# Patient Record
Sex: FEMALE | Race: WHITE
[De-identification: names, ages, dates, MRNs, and addresses within clinical notes are randomized per-mention and may not be internally consistent; named-entity substitution may affect disease eponyms.]

---

## 2018-06-12 ENCOUNTER — HOSPITAL ENCOUNTER (EMERGENCY)
Dept: HOSPITAL 62 - ER | Age: 28
LOS: 1 days | Discharge: HOME | End: 2018-06-13
Payer: SELF-PAY

## 2018-06-12 DIAGNOSIS — Z88.0: ICD-10-CM

## 2018-06-12 DIAGNOSIS — Z65.9: ICD-10-CM

## 2018-06-12 DIAGNOSIS — F32.9: Primary | ICD-10-CM

## 2018-06-12 DIAGNOSIS — R45.851: ICD-10-CM

## 2018-06-12 DIAGNOSIS — F41.9: ICD-10-CM

## 2018-06-12 LAB
ADD MANUAL DIFF: NO
ALBUMIN SERPL-MCNC: 4.1 G/DL (ref 3.5–5)
ALP SERPL-CCNC: 83 U/L (ref 38–126)
ALT SERPL-CCNC: 20 U/L (ref 9–52)
ANION GAP SERPL CALC-SCNC: 12 MMOL/L (ref 5–19)
APAP SERPL-MCNC: < 10 UG/ML (ref 10–30)
APPEARANCE UR: (no result)
APTT PPP: YELLOW S
AST SERPL-CCNC: 16 U/L (ref 14–36)
BARBITURATES UR QL SCN: NEGATIVE
BASOPHILS # BLD AUTO: 0.1 10^3/UL (ref 0–0.2)
BASOPHILS NFR BLD AUTO: 0.8 % (ref 0–2)
BILIRUB DIRECT SERPL-MCNC: 0.2 MG/DL (ref 0–0.4)
BILIRUB SERPL-MCNC: 0.3 MG/DL (ref 0.2–1.3)
BILIRUB UR QL STRIP: NEGATIVE
BUN SERPL-MCNC: 14 MG/DL (ref 7–20)
CALCIUM: 9.6 MG/DL (ref 8.4–10.2)
CHLORIDE SERPL-SCNC: 109 MMOL/L (ref 98–107)
CO2 SERPL-SCNC: 23 MMOL/L (ref 22–30)
EOSINOPHIL # BLD AUTO: 0.6 10^3/UL (ref 0–0.6)
EOSINOPHIL NFR BLD AUTO: 5.4 % (ref 0–6)
ERYTHROCYTE [DISTWIDTH] IN BLOOD BY AUTOMATED COUNT: 13.2 % (ref 11.5–14)
ETHANOL SERPL-MCNC: < 10 MG/DL
GLUCOSE SERPL-MCNC: 106 MG/DL (ref 75–110)
GLUCOSE UR STRIP-MCNC: NEGATIVE MG/DL
HCT VFR BLD CALC: 45.6 % (ref 36–47)
HGB BLD-MCNC: 15.6 G/DL (ref 12–15.5)
KETONES UR STRIP-MCNC: NEGATIVE MG/DL
LYMPHOCYTES # BLD AUTO: 3.4 10^3/UL (ref 0.5–4.7)
LYMPHOCYTES NFR BLD AUTO: 28.8 % (ref 13–45)
MCH RBC QN AUTO: 30.5 PG (ref 27–33.4)
MCHC RBC AUTO-ENTMCNC: 34.2 G/DL (ref 32–36)
MCV RBC AUTO: 89 FL (ref 80–97)
METHADONE UR QL SCN: NEGATIVE
MONOCYTES # BLD AUTO: 0.9 10^3/UL (ref 0.1–1.4)
MONOCYTES NFR BLD AUTO: 7.9 % (ref 3–13)
NEUTROPHILS # BLD AUTO: 6.8 10^3/UL (ref 1.7–8.2)
NEUTS SEG NFR BLD AUTO: 57.1 % (ref 42–78)
NITRITE UR QL STRIP: NEGATIVE
PCP UR QL SCN: NEGATIVE
PH UR STRIP: 5 [PH] (ref 5–9)
PLATELET # BLD: 341 10^3/UL (ref 150–450)
POTASSIUM SERPL-SCNC: 4.3 MMOL/L (ref 3.6–5)
PROT SERPL-MCNC: 7.5 G/DL (ref 6.3–8.2)
PROT UR STRIP-MCNC: NEGATIVE MG/DL
RBC # BLD AUTO: 5.11 10^6/UL (ref 3.72–5.28)
SALICYLATES SERPL-MCNC: < 1 MG/DL (ref 2–20)
SODIUM SERPL-SCNC: 144.2 MMOL/L (ref 137–145)
SP GR UR STRIP: 1.02
TOTAL CELLS COUNTED % (AUTO): 100 %
URINE AMPHETAMINES SCREEN: NEGATIVE
URINE BENZODIAZEPINES SCREEN: NEGATIVE
URINE COCAINE SCREEN: NEGATIVE
URINE MARIJUANA (THC) SCREEN: NEGATIVE
UROBILINOGEN UR-MCNC: NEGATIVE MG/DL (ref ?–2)
WBC # BLD AUTO: 11.9 10^3/UL (ref 4–10.5)

## 2018-06-12 PROCEDURE — 93005 ELECTROCARDIOGRAM TRACING: CPT

## 2018-06-12 PROCEDURE — 80053 COMPREHEN METABOLIC PANEL: CPT

## 2018-06-12 PROCEDURE — 84703 CHORIONIC GONADOTROPIN ASSAY: CPT

## 2018-06-12 PROCEDURE — 81001 URINALYSIS AUTO W/SCOPE: CPT

## 2018-06-12 PROCEDURE — 36415 COLL VENOUS BLD VENIPUNCTURE: CPT

## 2018-06-12 PROCEDURE — 80307 DRUG TEST PRSMV CHEM ANLYZR: CPT

## 2018-06-12 PROCEDURE — 99285 EMERGENCY DEPT VISIT HI MDM: CPT

## 2018-06-12 PROCEDURE — 93010 ELECTROCARDIOGRAM REPORT: CPT

## 2018-06-12 PROCEDURE — 85025 COMPLETE CBC W/AUTO DIFF WBC: CPT

## 2018-06-12 NOTE — ER DOCUMENT REPORT
ED General





- General


Mode of Arrival: Ambulatory


Information source: Patient


TRAVEL OUTSIDE OF THE [place] IN LAST 30 DAYS: No





<RHIANNA KHAN - Last Filed: 06/13/18 00:11>





<RADHA CRUZ - Last Filed: 06/13/18 02:48>





- General


Chief Complaint: Psych Problem


Stated Complaint: SUICIDAL IDEATION


Time Seen by Provider: 06/12/18 21:50


Notes: 





27 y.o female with a PMHx of asthma and depression presents to the ED with SI. 

She reports that for the past week she has been having SI which she has had in 

the past as well but has never been institutionalized or seen a counselor. Pt 

reports that she used to take Prozac but stopped taking it about a year ago 

because she does not have health insurance anymore. She states that she locked 

herself in her room all day today to avoid hurting herself. She reports a plan 

of cutting her arms. Pt denies any other medical issues.  (RHIANNA KHAN)





- Related Data


Allergies/Adverse Reactions: 


 





mustard [Mustard] Allergy (Verified 08/25/17 08:15)


 


Penicillins Allergy (Verified 08/25/17 08:15)


 











Past Medical History





- General


Information source: Patient, Parent





- Social History


Smoking Status: Never Smoker


Cigarette use (# per day): No


Chew tobacco use (# tins/day): No


Smoking Education Provided: No


Frequency of alcohol use: Occasional


Drug Abuse: None


Family History: Reviewed & Not Pertinent


Pulmonary Medical History: Reports: Hx Asthma


Renal/ Medical History: Denies: Hx Peritoneal Dialysis


Psychiatric Medical History: Reports: Hx Depression





- Immunizations


Hx Diphtheria, Pertussis, Tetanus Vaccination: Yes





<RHIANNA KHAN - Last Filed: 06/13/18 00:11>





Review of Systems





- Review of Systems


Constitutional: No symptoms reported


EENT: No symptoms reported


Cardiovascular: No symptoms reported


Respiratory: No symptoms reported


Gastrointestinal: No symptoms reported


Genitourinary: No symptoms reported


Female Genitourinary: No symptoms reported


Musculoskeletal: No symptoms reported


Skin: No symptoms reported


Hematologic/Lymphatic: No symptoms reported


Neurological/Psychological: See HPI, Suicidal ideation


-: Yes All other systems reviewed and negative





<RHIANNA KHAN - Last Filed: 06/13/18 00:11>





Physical Exam





<RHIANNA KHAN - Last Filed: 06/13/18 00:11>





<RADHA CRUZ - Last Filed: 06/13/18 02:48>





- Vital signs


Vitals: 





 











Temp Pulse Resp BP Pulse Ox


 


 99.0 F   93   20   123/70   96 


 


 06/12/18 22:07  06/12/18 22:07  06/12/18 22:07  06/12/18 22:07  06/12/18 22:07














- Notes


Notes: 





Physical Exam:


 





General: Alert.


 


HEENT: Normocephalic. Atraumatic. PERRL. Extraocular movements intact. 

Oropharynx clear.


 


Neck: Supple. Non-tender.


 


Respiratory: No respiratory distress. Clear and equal breath sounds bilaterally.


 


Cardiovascular: Regular rate and rhythm. 


 


Abdominal: Normal Inspection. Non-tender. No distension. Normal Bowel Sounds. 


 


Back: Non-tender. No deformity or step off.


 


Extremities: Moves all four extremities.


Upper extremities: Normal inspection. Normal ROM.  


Lower extremities: Normal inspection. No edema. Normal ROM.


 


Neurological: Normal cognition. AAOx3. Normal speech.  


 


Psychological: Flat affect.


 


Skin: Warm. Dry. Normal color. (RHIANNA KHAN)





Course





- Laboratory


Result Diagrams: 


 06/12/18 22:35





 06/12/18 22:35





<RHIANNA KHAN - Last Filed: 06/13/18 00:11>





- Laboratory


Result Diagrams: 


 06/12/18 22:35





 06/12/18 22:35





<RADHA CRUZ - Last Filed: 06/13/18 02:48>





- Re-evaluation


Re-evalutation: 





06/13/18 


Patient is a 27-year-old female with a history of depression, who comes in 

complaining of suicidal ideation with plan to take a knife and cut her arm.  

Patient has a history of depression and was taking Prozac up until a year ago 

when she lost her insurance.  She has been seen at  and see before.  She 

would have like to return up with the needed money upfront from her which the 

patient does not have.  Patient is medically stable with no acute findings on 

blood work.  She has been placed on involuntary commitment paper work due to 

her suicidal ideation with plan.  She will be evaluated by mental health in the 

morning.  Social work consult has also been placed.  Stable at this time.


 (RADHA CRUZ)





- Vital Signs


Vital signs: 





 











Temp Pulse Resp BP Pulse Ox


 


 99.0 F   93   20   123/70   96 


 


 06/12/18 22:07  06/12/18 22:07  06/12/18 22:07  06/12/18 22:07  06/12/18 22:07














- Laboratory


Laboratory results interpreted by me: 





 











  06/12/18 06/12/18 06/12/18





  22:35 22:35 22:35


 


WBC  11.9 H  


 


Hgb  15.6 H  


 


Chloride   109 H 


 


Urine Blood    SMALL H


 


Ur Leukocyte Esterase    SMALL H


 


Salicylates   < 1.0 L 


 


Acetaminophen   < 10 L 














Discharge





<RHIANNA KHAN - Last Filed: 06/13/18 00:11>





<RADHA CRUZ - Last Filed: 06/13/18 02:48>





- Discharge


Clinical Impression: 


 Suicidal ideation





Depression


Qualifiers:


 Depression Type: unspecified Qualified Code(s): F32.9 - Major depressive 

disorder, single episode, unspecified





Condition: Stable


Disposition: OTHER


Scribe Attestation: 





06/13/18 02:48


I personally performed the services described in the documentation, reviewed 

and edited the documentation which was dictated to the scribe in my presence, 

and it accurately records my words and actions. (RADHA CRUZ)





Scribe Documentation





- Scribe


Written by Scribe:: Yahaira Barrios 6/13/18 0006 


acting as scribe for :: Joanne





<RHIANNA KHAN - Last Filed: 06/13/18 00:11>

## 2018-06-13 VITALS — DIASTOLIC BLOOD PRESSURE: 70 MMHG | SYSTOLIC BLOOD PRESSURE: 142 MMHG
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
## 2019-01-25 ENCOUNTER — HOSPITAL ENCOUNTER (EMERGENCY)
Dept: HOSPITAL 62 - ER | Age: 29
Discharge: HOME | End: 2019-01-25
Payer: SELF-PAY

## 2019-01-25 VITALS — DIASTOLIC BLOOD PRESSURE: 77 MMHG | SYSTOLIC BLOOD PRESSURE: 120 MMHG

## 2019-01-25 DIAGNOSIS — R50.9: ICD-10-CM

## 2019-01-25 DIAGNOSIS — R07.81: ICD-10-CM

## 2019-01-25 DIAGNOSIS — Z88.0: ICD-10-CM

## 2019-01-25 DIAGNOSIS — R07.1: Primary | ICD-10-CM

## 2019-01-25 DIAGNOSIS — R05: ICD-10-CM

## 2019-01-25 PROCEDURE — 71046 X-RAY EXAM CHEST 2 VIEWS: CPT

## 2019-01-25 PROCEDURE — 99283 EMERGENCY DEPT VISIT LOW MDM: CPT

## 2019-01-25 PROCEDURE — 93010 ELECTROCARDIOGRAM REPORT: CPT

## 2019-01-25 PROCEDURE — 93005 ELECTROCARDIOGRAM TRACING: CPT

## 2019-01-25 NOTE — RADIOLOGY REPORT (SQ)
EXAM DESCRIPTION:  CHEST 2 VIEWS



COMPLETED DATE/TIME:  1/25/2019 6:30 pm



REASON FOR STUDY:  pain



COMPARISON:  2/4/2015



EXAM PARAMETERS:  NUMBER OF VIEWS: two views

TECHNIQUE: Digital Frontal and Lateral radiographic views of the chest acquired.

RADIATION DOSE: NA

LIMITATIONS: none



FINDINGS:  LUNGS AND PLEURA: No opacities, masses or pneumothorax. No pleural effusion.

MEDIASTINUM AND HILAR STRUCTURES: No masses or contour abnormalities.

HEART AND VASCULAR STRUCTURES: Heart normal size.  No evidence for failure.

BONES: No acute findings.

HARDWARE: None in the chest.

OTHER: No other significant finding.



IMPRESSION:  NO ACUTE RADIOGRAPHIC FINDING IN THE CHEST.



TECHNICAL DOCUMENTATION:  JOB ID:  [id number]

 2011 [employer]- All Rights Reserved



Reading location - IP/workstation name: [email or web address]

## 2019-01-25 NOTE — ER DOCUMENT REPORT
HPI





- HPI


Patient complains to provider of: chest pains


Time Seen by Provider: 01/25/19 18:03


Pain Level: 3


Context: 





The emergency she had a upper respiratory infection, cough, congestion, 

subjective fever.  Since then she has felt an intermittent pinching feeling in 

the center of her chest.  States she also then has an episode of posttussive 

vomiting.  Patient states she continues with a cough and congestion but no 

longer has any fevers from her URI illness last week.  Patient states chest pain

does increase when she takes a deep breath and when she touches the center of 

her chest.  Patient states she does not have insurance but does go to a mental 

health facility for routine counseling.  Patient denies any cardiac history for 

herself, her immediate family.





past medical history: Anxiety, asthma


Medications: None


Allergies: Penicillin





- REPRODUCTIVE


Reproductive: DENIES: Pregnant:





Past Medical History





- General


Information source: Patient





- Social History


Smoking Status: Unknown if Ever Smoked


Family History: Reviewed & Not Pertinent


Patient has suicidal ideation: No


Patient has homicidal ideation: No


Pulmonary Medical History: Reports: Hx Asthma


Renal/ Medical History: Denies: Hx Peritoneal Dialysis


Psychiatric Medical History: Reports: Hx Depression





- Immunizations


Hx Diphtheria, Pertussis, Tetanus Vaccination: Yes





Vertical Provider Document





- CONSTITUTIONAL


Agree With Documented VS: Yes


Notes: 


GENERAL: Alert, interacts well. No acute distress.


HEAD: Normocephalic, atraumatic.


EYES: Pupils equal, round, and reactive to light. Extraocular movements intact.


ENT: Oral mucosa moist, tongue midline. 


NECK: Full range of motion. Supple. Trachea midline.


LUNGS: Clear to auscultation bilaterally, no wheezes, rales, or rhonchi. No 

respiratory distress.


HEART: Regular rate and rhythm. No murmur


CHEST: Anterior posterior chest wall, no erythema or ecchymosis noted.


ABDOMEN: Soft, non-tender. Non-distended. Bowel sounds present in all 4 

quadrants.


EXTREMITIES: Moves all 4 extremities spontaneously. No edema, normal radial and 

dorsalis pedis pulses bilaterally. No cyanosis.


BACK: no cervical, thoracic, lumbar midline tenderness. No saddle anesthesia, 

normal distal neurovascular exam. 


NEUROLOGICAL: Alert and oriented x3. Normal speech. cranial nerves II through 

XII grossly intact 


PSYCH: Normal affect, normal mood.


SKIN: Warm, dry, normal turgor. No rashes or lesions noted.








- INFECTION CONTROL


TRAVEL OUTSIDE OF THE [place] IN LAST 30 DAYS: No





Course





- Re-evaluation


Re-evalutation: 





01/25/19 20:05


Patient's chest x-ray shows no signs of pneumothorax, pneumonia, cardiomegaly, 

rib fractures.  Patient's EKG shows a sinus rhythm at a rate of 78, , no 

ST segment elevations or depressions noted.





Because patient's chest pain is following a URI type illness, increases upon 

palpation, deep inspiration, cough I am suspecting this is muscular skeletal in 

nature.  Discussed close follow-up at [facility].  Patient voices 

understanding and states that Motrin has helped her pain.  Patient stable for 

discharge.





- Vital Signs


Vital signs: 


                                        











Temp Pulse Resp BP Pulse Ox


 


 98.3 F   71   18   117/68   98 


 


 01/25/19 15:34  01/25/19 15:34  01/25/19 15:34  01/25/19 15:34  01/25/19 15:34














Discharge





- Discharge


Clinical Impression: 


Chest pain


Qualifiers:


 Chest pain type: chest pain on breathing Qualified Code(s): R07.1 - Chest pain 

on breathing; R07.81 - Pleurodynia





Condition: Stable


Disposition: HOME, SELF-CARE


Instructions:  Chest Wall Pain ([facility])


Additional Instructions: 


As we discussed your x-ray and EKG were normal.  At this time I do believe your 

chest pain is muscular in nature.  Please continue to take over-the-counter 

Tylenol and Motrin for your generalized pain.  Please follow up at [facility], phone numbers will be provided in this packet.  Please return to the 

emergency room should you have any other signs and symptoms


Referrals: 


[facility] [Provider Group] - Follow up as needed

## 2019-02-21 ENCOUNTER — HOSPITAL ENCOUNTER (EMERGENCY)
Dept: HOSPITAL 62 - ER | Age: 29
Discharge: HOME | End: 2019-02-21
Payer: SELF-PAY

## 2019-02-21 VITALS — SYSTOLIC BLOOD PRESSURE: 153 MMHG | DIASTOLIC BLOOD PRESSURE: 81 MMHG

## 2019-02-21 DIAGNOSIS — R05: Primary | ICD-10-CM

## 2019-02-21 DIAGNOSIS — J45.909: ICD-10-CM

## 2019-02-21 DIAGNOSIS — R07.9: ICD-10-CM

## 2019-02-21 PROCEDURE — 93010 ELECTROCARDIOGRAM REPORT: CPT

## 2019-02-21 PROCEDURE — 93005 ELECTROCARDIOGRAM TRACING: CPT

## 2019-02-21 PROCEDURE — 71046 X-RAY EXAM CHEST 2 VIEWS: CPT

## 2019-02-21 PROCEDURE — 99283 EMERGENCY DEPT VISIT LOW MDM: CPT

## 2019-02-21 NOTE — RADIOLOGY REPORT (SQ)
EXAM DESCRIPTION:  CHEST 2 VIEWS



COMPLETED DATE/TIME:  2/21/2019 7:43 am



REASON FOR STUDY:  cough x2 weeks



COMPARISON:  Two-view chest 1/25/2019, 2/4/2015



EXAM PARAMETERS:  NUMBER OF VIEWS: two views

TECHNIQUE: Digital Frontal and Lateral radiographic views of the chest acquired.

RADIATION DOSE: NA

LIMITATIONS: none



FINDINGS:  LUNGS AND PLEURA: No opacities, masses or pneumothorax. No pleural effusion.

MEDIASTINUM AND HILAR STRUCTURES: No masses or contour abnormalities.

HEART AND VASCULAR STRUCTURES: Heart normal size.  No evidence for failure.

BONES: No acute findings.

HARDWARE: None in the chest.

OTHER: No other significant finding.



IMPRESSION:  NO ACUTE RADIOGRAPHIC FINDING IN THE CHEST.



TECHNICAL DOCUMENTATION:  JOB ID:  [id number]

 2011 [employer]- All Rights Reserved



Reading location - IP/workstation name: [email or web address]

## 2019-02-21 NOTE — ER DOCUMENT REPORT
ED General





- General


Chief Complaint: Chest Pain


Stated Complaint: CHEST PAIN


Time Seen by Provider: 02/21/19 07:04


Notes: 





Patient is a 28-year-old female who presents to the emergency department with a 

chief complaint of chest pain and a cough for the past 2 weeks.  She states that

she has a history of asthma.  She states that her chest pain is in the middle of

her chest and it happens every time she coughs.  She was seen here in the emerg

ency department at the end of January and at that time was sent home.  She also 

states that she does have a hard time taking in good deep breaths.  She denies 

any fever, nausea, diarrhea, abdominal pain, or flank pain.


TRAVEL OUTSIDE OF THE [place] IN LAST 30 DAYS: No





- Related Data


Allergies/Adverse Reactions: 


                                        





mustard [Mustard] Allergy (Verified 01/25/19 15:04)


   


Penicillins Allergy (Verified 01/25/19 15:04)


   











Past Medical History





- Social History


Smoking Status: Never Smoker


Chew tobacco use (# tins/day): No


Frequency of alcohol use: None


Drug Abuse: None


Family History: Reviewed & Not Pertinent


Patient has suicidal ideation: No


Patient has homicidal ideation: No


Pulmonary Medical History: Reports: Hx Asthma


Renal/ Medical History: Denies: Hx Peritoneal Dialysis


Psychiatric Medical History: Reports: Hx Depression - and anxiety





- Immunizations


Hx Diphtheria, Pertussis, Tetanus Vaccination: Yes





Review of Systems





- Review of Systems


Notes: 





REVIEW OF SYSTEMS:





CONSTITUTIONAL :    Denies recent illness.  Denies recent unintentional weight 

loss.  Denies fever,  chills, or sweats. 


EENT: Denies eye, ear, throat, or mouth pain, discharge, or symptoms.  Denies 

nasal or sinus congestion.


CARDIOVASCULAR: See HPI


RESPIRATORY: See HPI


GASTROINTESTINAL: Denies nausea, vomiting, and diarrhea.  Denies abdominal pain.

  Denies constipation. 


GENITOURINARY:  Denies difficulty urinating, burning, blood in urine, urgency or

 frequency.


MUSCULOSKELETAL:  Denies neck and back pain.  Denies joint pain or swelling.


SKIN:   Denies rash, itchiness, or lesions


HEMATOLOGIC :   Denies easy bruising or bleeding.


LYMPHATIC:  Denies swollen, painful, enlarged glands.


NEUROLOGICAL: Denies no numbness or tingling denies weakness.  Denies headache. 

 Denies altered mental status.  Denies alteration in speech.


PSYCHIATRIC:  Denies stress, anxiety, alteration in sleep patterns, or 

depression.





All other systems reviewed and negative.





Physical Exam





- Vital signs


Vitals: 


                                        











Temp Pulse Resp BP Pulse Ox


 


 98.1 F   90   22 H  147/92 H  97 


 


 02/21/19 05:49  02/21/19 05:49  02/21/19 05:49  02/21/19 05:49  02/21/19 05:49














- Notes


Notes: 





PHYSICAL EXAMINATION:





GENERAL: Appears well, healthy, well-nourished, no acute distress. 





HEAD:  Normocephalic, atraumatic.





EYES:  PERRL, conjunctiva normal, all extraocular movements intact, sclera 

nonicteric





ENT:  Moist mucous membranes. 





NECK: Supple, no noticeable swelling, redness, rash.  Normal range of motion.





LUNGS: Equal breath sounds bilaterally and clear to auscultation.  No wheezes 

rales or rhonchi.





CARDIOVASCULAR: S1-S2, regular rate, regular rhythm.  Radial pulses 2+, normal.





ABDOMEN: Normoactive bowel sounds.  Soft, nontender,  no guarding, no rebound 

tenderness, and no masses palpated.





EXTREMITIES: Normal strength and range of motion, no pitting or edema.  No 

cyanosis. 





NEUROLOGICAL: Moves all extremities upon command.  Strength 5/5 in all 

extremities. 





PSYCH: Normal mood, normal affect.





SKIN: Warm, dry.  No rash, lesions, ulcerations noted.  Normal skin turgor.





Course





- Re-evaluation


Re-evalutation: 


Differential diagnosis includes pneumonia, bronchitis, upper respiratory viral i

nfection.  I do not suspect the patient has an acute MI, acute pulmonary emboli,

 or any other life-threatening etiology based off patient's symptoms and the 

fact that she has had a cough for a long period of time.


02/21/19 07:18


The patient will be given Tessalon Perles for her symptoms and a chest x-ray 

will be done.


02/21/19 08:29


The patient's chest x-ray is negative for pneumonia.  She will be given an 

albuterol inhaler and Tessalon Perles to help with her symptoms.  She does not 

look septic in appearance.  She actually looks rather well for having a cough.  

Verbal discharge instructions were given to the patient.  They verbalized 

understanding.  They are stable for discharge.




















- Vital Signs


Vital signs: 


                                        











Temp Pulse Resp BP Pulse Ox


 


 97.7 F   70   16   153/81 H  97 


 


 02/21/19 09:08  02/21/19 09:08  02/21/19 09:08  02/21/19 09:08  02/21/19 09:08














- EKG Interpretation by Me


Additional EKG results interpreted by me: 





02/21/19 07:19


Sinus rhythm.  Heart rate 81.  ; QRS 88; ; .  No ST 

elevations or depressions.  No change from previous EKG.





Discharge





- Discharge


Clinical Impression: 


 Cough





Condition: Stable


Disposition: HOME, SELF-CARE


Additional Instructions: 


You were seen today in the emergency department for a cough.  Your symptoms are 

most consistent with bronchitis.  You have been given an albuterol inhaler, to 

help with your difficulty breathing.  You have also been given Tessalon Perles, 

medication to help with your cough.  Please take as directed.  If you have 

worsening symptoms, or unable to breathe, or have symptoms that are worrisome to

 you, please return to the emergency department.


Prescriptions: 


Benzonatate [Tessalon Perles 100 mg Capsule] 100 mg PO Q8HP PRN #40 capsule


 PRN Reason: 


Forms:  Return to Work

## 2019-03-28 ENCOUNTER — HOSPITAL ENCOUNTER (EMERGENCY)
Dept: HOSPITAL 62 - ER | Age: 29
Discharge: HOME | End: 2019-03-28
Payer: SELF-PAY

## 2019-03-28 VITALS — SYSTOLIC BLOOD PRESSURE: 148 MMHG | DIASTOLIC BLOOD PRESSURE: 97 MMHG

## 2019-03-28 DIAGNOSIS — R19.7: ICD-10-CM

## 2019-03-28 DIAGNOSIS — B97.89: ICD-10-CM

## 2019-03-28 DIAGNOSIS — R11.10: ICD-10-CM

## 2019-03-28 DIAGNOSIS — J06.9: Primary | ICD-10-CM

## 2019-03-28 DIAGNOSIS — R05: ICD-10-CM

## 2019-03-28 DIAGNOSIS — R09.81: ICD-10-CM

## 2019-03-28 DIAGNOSIS — J45.909: ICD-10-CM

## 2019-03-28 DIAGNOSIS — J02.9: ICD-10-CM

## 2019-03-28 PROCEDURE — 87880 STREP A ASSAY W/OPTIC: CPT

## 2019-03-28 PROCEDURE — 87070 CULTURE OTHR SPECIMN AEROBIC: CPT

## 2019-03-28 PROCEDURE — 99283 EMERGENCY DEPT VISIT LOW MDM: CPT

## 2019-03-28 PROCEDURE — S0119 ONDANSETRON 4 MG: HCPCS

## 2019-03-28 PROCEDURE — 96372 THER/PROPH/DIAG INJ SC/IM: CPT

## 2019-03-28 PROCEDURE — 87077 CULTURE AEROBIC IDENTIFY: CPT

## 2019-03-28 NOTE — ER DOCUMENT REPORT
HPI





- HPI


Time Seen by Provider: 03/28/19 19:23


Pain Level: 5


Notes: 





Patient is an otherwise healthy 28-year-old female who presents to the emergency

department with chief complaint of cough, congestion and diarrhea for 3 days.  

Patient reports she has vomited a few times as well.  Patient reports she took 

Zofran at home which helped her symptoms.  She denies any abdominal pain, denies

any back pain or dysuria.  Patient denies any abnormal vaginal discharge.








- EENT


EENT: REPORTS: Sore Throat





- RESPIRATORY


Respiratory: REPORTS: Coughing





- GASTROINTESTINAL


Gastrointestinal: DENIES: Black / Bloody Stools





- REPRODUCTIVE


Reproductive: DENIES: Pregnant:





Past Medical History





- General


Information source: Patient





- Social History


Smoking Status: Never Smoker


Family History: Reviewed & Not Pertinent


Patient has suicidal ideation: No


Patient has homicidal ideation: No


Pulmonary Medical History: Reports: Hx Asthma


Renal/ Medical History: Denies: Hx Peritoneal Dialysis


Psychiatric Medical History: Reports: Hx Depression - and anxiety





- Immunizations


Hx Diphtheria, Pertussis, Tetanus Vaccination: Yes





Vertical Provider Document





- CONSTITUTIONAL


Notes: 





PHYSICAL EXAMINATION:





GENERAL: Well-appearing, well-nourished and in no acute distress.





HEAD: Atraumatic, normocephalic.





EYES: Pupils equal round extraocular movements intact,  conjunctiva are normal.





ENT: Nares patent, throat mildly erythematous but without tonsillar swelling or 

exudates.  No evidence of peritonsillar abscess.





NECK: Normal range of motion, mild cervical lymphadenopathy.





LUNGS: No respiratory distress, lung sounds clear to auscultation bilaterally.





Musculoskeletal: Normal range of motion





NEUROLOGICAL:  Normal speech, normal gait. 





PSYCH: Normal mood, normal affect.





SKIN: Warm, Dry, normal turgor, no rashes or lesions noted.





- INFECTION CONTROL


TRAVEL OUTSIDE OF THE [place] IN LAST 30 DAYS: No





Course





- Re-evaluation


Re-evalutation: 





Rapid strep is negative.  Patient able to tolerate p.o. after administration of 

antiemetics.  Physical examination is unremarkable, abdomen is soft, nontender. 

Throat mildly erythematous but without tonsillar swelling or exudates, no 

evidence of peritonsillar abscess.  Likely viral upper respiratory illness.  

Patient stable for discharge home.











- Vital Signs


Vital signs: 


                                        











Temp Pulse Resp BP Pulse Ox


 


 97.9 F   103 H  18   137/76 H  96 


 


 03/28/19 18:04  03/28/19 18:04  03/28/19 18:04  03/28/19 18:04  03/28/19 18:04














Discharge





- Discharge


Clinical Impression: 


 Sore throat, Viral upper respiratory illness





Condition: Stable


Disposition: HOME, SELF-CARE


Additional Instructions: 


Upper Respiratory Illness


     You have a viral infection of the respiratory passages -- a "cold."  This 

common infection causes nasal congestion, drainage, and often sore throat and 

cough.  It is caused by a virus and is highly contagious.  The disease usually 

lasts a week or more, though the worst symptoms are usually over in 3 or 4 days.


     There is no "cure" for the viral infection -- it must run its course.  If 

there is a complication, such as bacterial infection in the nose, sinuses, 

middle ear, or bronchial tubes, antibiotics may be required, but antibiotics 

won't affect the virus.


     If you smoke, you should STOP!!  Drink plenty of fluids.  A humidifier may 

help.  An expectorant medication or decongestant may make you more comfortable. 

Use acetaminophen or ibuprofen for fever or aches.


     See the doctor if fever persists over two or three days, if there is any 

significant worsening of your symptoms, or if you simply fail to improve as 

expected.








SORE THROAT:


     Sore throats may be caused by viruses, bacteria, or fungi.  Most are due to

a virus, and must get better on their own.  Bacterial sore throats, particularly

those due to "strep," need treatment with antibiotics.


     If an antibiotic is prescribed, be sure to take the medication for a full 

10 days.  Failure to take the antibiotic can result in complications such as 

rheumatic fever.  Sometimes, an injection of antibiotics is given instead of 

pills or liquid.  This single "shot" is equal in effectiveness to the oral 

medication.


     To relieve symptoms, take acetaminophen for pain.  Sip clear liquids 

frequently, or eat popsicles or ice chips.  Anesthetic sprays or lozenges may 

help.  Make sure the air in the room is not too dry. Avoid using decongestants 

or antihistamines.


     Call the doctor if there is no improvement in two days, or if you have 

difficulty breathing, increasing throat pain, high fever, rash, or frequent 

vomiting.








FOLLOW-UP CARE:


If you have been referred to a physician for follow-up care, call the [facility] office for an appointment as you were instructed or within the next two 

days.  If you experience worsening or a significant change in your symptoms, 

notify the physician immediately or return to the Emergency Department at any 

time for re-evaluation.





****The rapid strep test today was negative.  Please take Zofran if needed for 

nausea.  Drink plenty of fluids.  Throat culture is pending, someone will call 

you in the next 48-72 hours if there is any abnormality.****








Prescriptions: 


Ondansetron [Zofran Odt 4 mg Tablet] 1 - 2 tab PO Q4H PRN #15 tab.rapdis


 PRN Reason: For Nausea/Vomiting


Promethazine HCl [Phenergan 25 mg Tablet] 1 - 2 tab PO Q6H PRN #15 tablet


 PRN Reason: 


Forms:  Return to Work

## 2019-11-07 ENCOUNTER — HOSPITAL ENCOUNTER (EMERGENCY)
Dept: HOSPITAL 62 - ER | Age: 29
Discharge: HOME | End: 2019-11-07
Payer: SELF-PAY

## 2019-11-07 VITALS — DIASTOLIC BLOOD PRESSURE: 92 MMHG | SYSTOLIC BLOOD PRESSURE: 150 MMHG

## 2019-11-07 DIAGNOSIS — K08.89: ICD-10-CM

## 2019-11-07 DIAGNOSIS — K08.409: ICD-10-CM

## 2019-11-07 DIAGNOSIS — Z87.891: ICD-10-CM

## 2019-11-07 DIAGNOSIS — J45.909: ICD-10-CM

## 2019-11-07 DIAGNOSIS — K02.9: Primary | ICD-10-CM

## 2019-11-07 PROCEDURE — 96374 THER/PROPH/DIAG INJ IV PUSH: CPT

## 2019-11-07 PROCEDURE — 99282 EMERGENCY DEPT VISIT SF MDM: CPT

## 2019-11-07 NOTE — ER DOCUMENT REPORT
HPI





- HPI


Time Seen by Provider: 11/07/19 19:31


Notes: 





29-year-old female presents to the emergency room for evaluation of tooth pain 

that has been occurring for the last 3 weeks.  Patient states that her tooth had

been "rotting out for months" and patient manually took out her own tooth.  Pain

is 7 out of 10, throbbing achy.  Patient is currently on her menstrual cycle.  

Patient states she has not been unable to get into a dentist due to lack of 

dental insurance and when she is, [facility] clinic, she is unable to get an

appointment.  Patient states she is not a smoker.





- REPRODUCTIVE


Reproductive: DENIES: Pregnant:





Past Medical History





- Social History


Smoking Status: Former Smoker


Family History: Reviewed & Not Pertinent


Pulmonary Medical History: Reports: Hx Asthma


Renal/ Medical History: Denies: Hx Peritoneal Dialysis


Psychiatric Medical History: Reports: Hx Depression - and anxiety





- Immunizations


Hx Diphtheria, Pertussis, Tetanus Vaccination: Yes





Vertical Provider Document





- INFECTION CONTROL


TRAVEL OUTSIDE OF THE [place] IN LAST 30 DAYS: No





Course





- Re-evaluation


Re-evalutation: 





11/07/19 19:52


 Airway is patent.  Vitals within normal limits.  Patient is able swallow 

without any difficulty.  There is no significant facial swelling.  No evidence 

of Caleb angina, apical abscess, or airway obstruction.  Patient will be 

started on antibiotics.  I've instructed to follow-up with dentistry as earliest

ability for definitive management.  At this time will discharge with return 

precautions and follow-up recommendations.  Verbal discharge instructions given 

a the bedside and opportunity for questions given. Medication warnings reviewed.

Patient is in agreement with this plan and has verbalized understanding of 

return precautions and the need for primary care follow-up in the next 24-72 

hours.








- Vital Signs


Vital signs: 


                                        











Temp Pulse Resp BP Pulse Ox


 


 98.4 F   87   16   150/92 H  96 


 


 11/07/19 19:25  11/07/19 19:25  11/07/19 19:25  11/07/19 19:25  11/07/19 19:25














Discharge





- Discharge


Clinical Impression: 


 Dental caries, Tooth missing





Condition: Stable


Disposition: HOME, SELF-CARE


Instructions:  Toothache ([facility]), [facility], Clindamycin ([facility]), 

Dentist


Additional Instructions: 


Return immediately for any new or worsening symptoms.





Follow up with primary care provider, call tomorrow to make followup 

appointment.


Prescriptions: 


Clindamycin HCl 300 mg PO Q6H #28 capsule


Meloxicam [Mobic] 7.5 mg PO DAILY #7 tablet


Forms:  Return to Work


Referrals: 


OVIDIO POMPA MD [ACTIVE STAFF] - Follow up as needed